# Patient Record
Sex: FEMALE | Race: WHITE | HISPANIC OR LATINO | Employment: UNEMPLOYED | URBAN - METROPOLITAN AREA
[De-identification: names, ages, dates, MRNs, and addresses within clinical notes are randomized per-mention and may not be internally consistent; named-entity substitution may affect disease eponyms.]

---

## 2017-11-27 ENCOUNTER — OFFICE VISIT (OUTPATIENT)
Dept: MEDICAL GROUP | Facility: MEDICAL CENTER | Age: 37
End: 2017-11-27
Attending: NURSE PRACTITIONER
Payer: MEDICAID

## 2017-11-27 VITALS
RESPIRATION RATE: 16 BRPM | WEIGHT: 144.6 LBS | HEART RATE: 74 BPM | TEMPERATURE: 97.3 F | SYSTOLIC BLOOD PRESSURE: 112 MMHG | OXYGEN SATURATION: 97 % | HEIGHT: 62 IN | BODY MASS INDEX: 26.61 KG/M2 | DIASTOLIC BLOOD PRESSURE: 64 MMHG

## 2017-11-27 DIAGNOSIS — Z00.00 WELL WOMAN EXAM (NO GYNECOLOGICAL EXAM): ICD-10-CM

## 2017-11-27 PROCEDURE — 99212 OFFICE O/P EST SF 10 MIN: CPT | Performed by: NURSE PRACTITIONER

## 2017-11-27 PROCEDURE — 99395 PREV VISIT EST AGE 18-39: CPT | Performed by: NURSE PRACTITIONER

## 2017-11-27 ASSESSMENT — PATIENT HEALTH QUESTIONNAIRE - PHQ9: CLINICAL INTERPRETATION OF PHQ2 SCORE: 0

## 2017-11-27 NOTE — PROGRESS NOTES
Chief Complaint: Annual Wellness Exam  HPI:  Shantel Suazo is a 37 y.o. female who presents for annual wellness exam. Generally the patient is feeling good. She has no complaints or concerns.    Regarding her health maintenance:  Last pap: 2016  Abnormal Pap hx: no  Pt postmenopausal: no  Last Mammo: 35  Abnormal mammo hx: no  Last colonoscopy: no  DEXA:no    Vaccines:  Last Tetanus booster: 2016  Influenza vaccination: 2016  Pneumococcal vaccination: n/a  Zostavax: n/a    Risk Factors:  Depression screening: using standardized screening tools: No depression  Ability to perform ADL’s: Able to perform all activities of daily living.  Hearing impairment: No hearing impairment.  Fall risks: No increased risk of falls. Uses assistive device: no  Regular exercise: no   Safety Management: Normal safety precautions including seatbelts and smoke detectors.  Cognitive function: using standardized screening tools: Normal cognitive function.  Urinary and bowel function. No incontinence, nocturia or frequency. Regular bowels. No diarrhea, constipation or rectal bleeding/melena.  Advanced directive : no information provided    Chronic Medical conditions include:  No problem-specific Assessment & Plan notes found for this encounter.      Other physicians seen:    Past medical history, family history, social history and medications reviewed and updated in the record.   Family History   Problem Relation Age of Onset   • Hypertension Mother    • Hypertension Father      Social History     Social History   • Marital status: Single     Spouse name: N/A   • Number of children: N/A   • Years of education: N/A     Occupational History   • Not on file.     Social History Main Topics   • Smoking status: Never Smoker   • Smokeless tobacco: Never Used   • Alcohol use No   • Drug use: No   • Sexual activity: Yes     Partners: Male     Birth control/ protection: Condom     Other Topics Concern   • Not on file     Social History  Narrative   • No narrative on file     Current Outpatient Prescriptions   Medication Sig Dispense Refill   • norethindrone (MICRONOR) 0.35 MG tablet Take 1 Tab by mouth every day. 28 Tab 11   • ibuprofen (MOTRIN) 800 MG Tab Take 1 Tab by mouth every 8 hours as needed (Cramping). 30 Tab 3   • docusate sodium (COLACE) 100 MG Cap Take 1 Cap by mouth 2 times a day. 60 Cap 3   • Prenatal MV-Min-Fe Fum-FA-DHA (PRENATAL 1 PO) Take  by mouth.       No current facility-administered medications for this visit.      Chronic Narcotic Medications: no    Review Of Systems  Constitutional: negative for fever, chills, weight loss or gain, fatigue, weakness  Skin: negative for pigmentation changes, rash, itching, ulcerations or nonhealing lesions  Head: negative for injury, HA  Eyes:negative for visual changes, blurred vision, double vision, eye pain, floaters. No history of cataracts or glaucoma. Corrective lenses- no, Last eye exam 2014  Ears: negative for tinnitus, hearing loss  Nose: negative for frequent URI's, sinus pain, loss of smell, chronic congestion, epistaxis  Throat: negative for persistent sore throat, dry mouth, bleeding gums, dental problems and hoarseness. Last dental exam 2015  Neck: negative for swollen glands, pain, stiffness  Respiratory: negative for cough, hemoptysis, SOB. Chronic O2 use no  Cardiovascular: negative for palpitations, chest pain, orthopnea, or peripheral edema. Negative for extremity pain at rest, claudication or color changes.  Breast: Denies breast tenderness, mass, discharge, changes in size or contour  Gastrointestinal: negative for poor appetite, dysphagia, nausea, heartburn, abdominal pain, constipation or diarrhea, changes in bowel habits, rectal bleeding  Urinary: negative for nocturia, dysuria, frequency, hematuria, hesitancy, incontinence.  Genital: negative for abnormal vaginal discharge or abnormal vaginal bleeding. Negative for hot flashes, vaginal atrophy or itching.  "   Musculoskeletal: negative for joint pain, swelling, redness, decreased range of motion. Negative for muscle pain, weakness. Negative for back pain.   Neurologic: negative for headaches, involuntary movements and tremor, weakness, numbness, dizziness, memory loss  Psychiatric: negative for sleep disturbance, anxiety, depression, excessive alcohol consumption and illegal drug usage.  Hematologic/Lymphatic/Immunologic: negative for bleeding, bruising, HIV risk factors, frequent infections  Endocrine:  Negative for heat or cold intolerance, polyuria, polydypsia    PHYSICAL EXAMINATION:  /64   Pulse 74   Temp 36.3 °C (97.3 °F)   Resp 16   Ht 1.575 m (5' 2\")   Wt 65.6 kg (144 lb 9.6 oz)   LMP 01/28/2016   SpO2 97%   BMI 26.45 kg/m²    General appearance: well developed, well nourished, well-groomed. alert and oriented X4, no distress, cooperative and pleasant with the exam.  Head: normocephalic, atraumatic  Eyes: conjunctivae and sclerae normal, PERRL  ENT: Ears: external ears normal to inspection and palpation, canals clear, TM's clear, normal light reflex, hearing normal   Nose/Sinuses: nose shows no deformity, asymmetry, or inflammation, nasal mucosa normal and pink, no lesions or sweeling, no sinus tenderness, nares patent.   Oropharynx: lips normal without lesions, buccal mucosa normal, gums healthy, teeth intact, tongue midline and normal. Tonsils normal without exudates, lesions or ulcers.  Neck: no asymmetry, masses, or scars, no adenopathy, trachea midline, thyroid normal to palpation, no jugular venous distention. Full ROM  Lungs/Chest:chest symmetric with normal A/P diameter, normal respiratory rate and rhythm, normal effort, no chest wall tenderness, lungs clear to auscultation. No wheezes, rhonchi or rales. Good air movement.   Cardiovascular: PMI normal, regular rate and rhythm, no murmurs, clicks, or gallops, no peripheral edema. Carotid arteries with 4/4 brisk upstroke and no bruits. " Pulses equal and symmetrical. No varicosities.  Abdomen: no scars or lesions, no masses palpable, no organomegaly.  Bowel sounds normal, no bruits heard, soft, non-tender, no guarding, rebound or distention.   Musculoskeletal:no evidence of joint effusion, ROM of all joints is normal, no crepitation detected, strength normal, no clubbing or cyanosis, no deformities present. No tenderness, warmth or swelling. No rigidity.  Lymphatic: None significantly enlarged or tender- cervical, supraclavicular, axillary, or inguinal.  Skin: color normal, vascularity normal, no rashes or suspicious lesions, no evidence of bleeding or bruising. Good turgor.  Neuro: speech normal, CN II-XII intact, DTR's symmetrical and normal, mental status intact, gait grossly normal, muscle tone normal, muscle strength normal, no sensory deficits. Normal Romberg.  Psychiatric: affect normal, normal memory, insight and judgement normal, concentration and focus normal. Depression screening is normal.   Depression Screen (PHQ-2/PHQ-9) 10/20/2016 10/21/2016 11/27/2017   PHQ-2 Total Score 0 0 -   PHQ-2 Total Score - - 0   PHQ-9 Total Score 0 0 -       Interpretation of PHQ-9 Total Score   Score Severity   1-4 Minimal Depression   5-9 Mild Depression   10-14 Moderate Depression   15-19 Moderately Severe Depression   20-27 Severe Depression    Cognition is tested no deficits noted. FALL RISK ASSESSMENT    Up an Go Test score:        A time of over 14 seconds identifies patient as potential fall risk.           ASSESSMENT/Plan:  Well Woman Exam         Health care Screening recommendations are reviewed with the patient today  Discussion today about general wellness and lifestyle habits to include but not limited to: Fall prevention, Engaging in regular exercise, Quitting smoking if indicated, Weight loss if indicated/ Healthy diet, Skin care  Please follow all instructions that were discussed at your visit.   Please take all medications as instructed. If  you have any adverse reactions, questions, or concerns please contact me immediately or seek emergency medical attention.  Please FU with any established specialists or with new referrals that were made today  Routine FU as scheduled

## 2018-05-16 ENCOUNTER — OFFICE VISIT (OUTPATIENT)
Dept: MEDICAL GROUP | Facility: MEDICAL CENTER | Age: 38
End: 2018-05-16
Attending: NURSE PRACTITIONER
Payer: MEDICAID

## 2018-05-16 VITALS
WEIGHT: 144 LBS | RESPIRATION RATE: 16 BRPM | TEMPERATURE: 97.2 F | HEIGHT: 64 IN | HEART RATE: 84 BPM | SYSTOLIC BLOOD PRESSURE: 114 MMHG | DIASTOLIC BLOOD PRESSURE: 64 MMHG | BODY MASS INDEX: 24.59 KG/M2

## 2018-05-16 DIAGNOSIS — Z00.00 WELL WOMAN EXAM (NO GYNECOLOGICAL EXAM): ICD-10-CM

## 2018-05-16 PROCEDURE — 99213 OFFICE O/P EST LOW 20 MIN: CPT | Performed by: NURSE PRACTITIONER

## 2018-05-16 PROCEDURE — 99396 PREV VISIT EST AGE 40-64: CPT | Performed by: NURSE PRACTITIONER

## 2018-05-16 NOTE — PROGRESS NOTES
Chief Complaint: Annual Wellness Exam  HPI:  Shantel Suazo is a 38 y.o. female who presents for annual wellness exam. Generally the patient is feeling good. She has no complaints or concerns.    Regarding her health maintenance:  Last pap: 2017  Abnormal Pap hx: no  Pt postmenopausal: 5/3/2018  Last Mammo: never. Prefers to wait until 39  Abnormal mammo hx: n/a, but no h/o breast cancer in family  Last colonoscopy: n/a  DEXA:N\A    Vaccines:  Last Tetanus booster: 2017  Influenza vaccination: 2017  Pneumococcal vaccination: n/a  Zostavax: n/a    Risk Factors:  Depression screening: using standardized screening tools: No depression  Ability to perform ADL’s: Able to perform all activities of daily living.  Hearing impairment: No hearing impairment.  Fall risks: No increased risk of falls. Uses assistive device: no  Regular exercise: yes   Safety Management: Normal safety precautions including seatbelts and smoke detectors.  Cognitive function: using standardized screening tools: Normal cognitive function.  Urinary and bowel function. No incontinence, nocturia or frequency. Regular bowels. No diarrhea, constipation or rectal bleeding/melena.  Advanced directive:  information provided    Chronic Medical conditions include:    Other physicians seen:    Past medical history, family history, social history and medications reviewed and updated in the record.   Family History   Problem Relation Age of Onset   • Hypertension Mother    • Hypertension Father      Social History     Social History   • Marital status: Single     Spouse name: N/A   • Number of children: N/A   • Years of education: N/A     Occupational History   • Not on file.     Social History Main Topics   • Smoking status: Never Smoker   • Smokeless tobacco: Never Used   • Alcohol use No   • Drug use: No   • Sexual activity: Yes     Partners: Male     Birth control/ protection: Condom     Other Topics Concern   • Not on file     Social History  Narrative   • No narrative on file     Current Outpatient Prescriptions   Medication Sig Dispense Refill   • norethindrone (MICRONOR) 0.35 MG tablet Take 1 Tab by mouth every day. 28 Tab 11   • ibuprofen (MOTRIN) 800 MG Tab Take 1 Tab by mouth every 8 hours as needed (Cramping). 30 Tab 3   • docusate sodium (COLACE) 100 MG Cap Take 1 Cap by mouth 2 times a day. 60 Cap 3   • Prenatal MV-Min-Fe Fum-FA-DHA (PRENATAL 1 PO) Take  by mouth.       No current facility-administered medications for this visit.      Chronic Narcotic Medications: no    Review Of Systems  Constitutional: negative for fever, chills, weight loss or gain, fatigue, weakness  Skin: negative for pigmentation changes, rash, itching, ulcerations or nonhealing lesions  Head: negative for injury, HA  Eyes:negative for visual changes, blurred vision, double vision, eye pain, floaters. No history of cataracts or glaucoma. Corrective lenses- no, Last eye exam 2015  Ears: negative for tinnitus, hearing loss  Nose: negative for frequent URI's, sinus pain, loss of smell, chronic congestion, epistaxis  Throat: negative for persistent sore throat, dry mouth, bleeding gums, dental problems and hoarseness. Last dental exam 2015  Neck: negative for swollen glands, pain, stiffness  Respiratory: negative for cough, hemoptysis, SOB. Chronic O2 use no  Cardiovascular: negative for palpitations, chest pain, orthopnea, or peripheral edema. Negative for extremity pain at rest, claudication or color changes.  Breast: Denies breast tenderness, mass, discharge, changes in size or contour  Gastrointestinal: negative for poor appetite, dysphagia, nausea, heartburn, abdominal pain, constipation or diarrhea, changes in bowel habits, rectal bleeding  Urinary: negative for nocturia, dysuria, frequency, hematuria, hesitancy, incontinence.  Genital: negative for abnormal vaginal discharge or abnormal vaginal bleeding. Negative for hot flashes, vaginal atrophy or itching.  "   Musculoskeletal: negative for joint pain, swelling, redness, decreased range of motion. Negative for muscle pain, weakness. Negative for back pain.   Neurologic: negative for headaches, involuntary movements and tremor, weakness, numbness, dizziness, memory loss  Psychiatric: negative for sleep disturbance, anxiety, depression, excessive alcohol consumption and illegal drug usage.  Hematologic/Lymphatic/Immunologic: negative for bleeding, bruising, HIV risk factors, frequent infections  Endocrine:  Negative for heat or cold intolerance, polyuria, polydypsia    PHYSICAL EXAMINATION:  /64   Pulse 84   Temp 36.2 °C (97.2 °F)   Resp 16   Ht 1.626 m (5' 4\")   Wt 65.3 kg (144 lb)   LMP 01/28/2016   BMI 24.72 kg/m²    General appearance: well developed, well nourished, well-groomed. alert and oriented X4, no distress, cooperative and pleasant with the exam.  Head: normocephalic, atraumatic  Eyes: conjunctivae and sclerae normal, PERRL  ENT: Ears: external ears normal to inspection and palpation, canals clear, TM's clear, normal light reflex, hearing normal   Nose/Sinuses: nose shows no deformity, asymmetry, or inflammation, nasal mucosa normal and pink, no lesions or sweeling, no sinus tenderness, nares patent.   Oropharynx: lips normal without lesions, buccal mucosa normal, gums healthy, teeth intact, tongue midline and normal. Tonsils normal without exudates, lesions or ulcers.  Neck: no asymmetry, masses, or scars, no adenopathy, trachea midline, thyroid normal to palpation, no jugular venous distention. Full ROM  Lungs/Chest:chest symmetric with normal A/P diameter, normal respiratory rate and rhythm, normal effort, no chest wall tenderness, lungs clear to auscultation. No wheezes, rhonchi or rales. Good air movement.   Cardiovascular: PMI normal, regular rate and rhythm, no murmurs, clicks, or gallops, no peripheral edema. Carotid arteries with 4/4 brisk upstroke and no bruits. Pulses equal and " symmetrical. No varicosities.  Abdomen: no scars or lesions, no masses palpable, no organomegaly.  Bowel sounds normal, no bruits heard, soft, non-tender, no guarding, rebound or distention.   Musculoskeletal:no evidence of joint effusion, ROM of all joints is normal, no crepitation detected, strength normal, no clubbing or cyanosis, no deformities present. No tenderness, warmth or swelling. No rigidity.  Lymphatic: None significantly enlarged or tender- cervical, supraclavicular, axillary, or inguinal.  Skin: color normal, vascularity normal, no rashes or suspicious lesions, no evidence of bleeding or bruising. Good turgor.  Neuro: speech normal, CN II-XII intact, DTR's symmetrical and normal, mental status intact, gait grossly normal, muscle tone normal, muscle strength normal, no sensory deficits. Normal Romberg.  Psychiatric: affect normal, normal memory, insight and judgement normal, concentration and focus normal. Depression screening is normal.   Depression Screen (PHQ-2/PHQ-9) 10/20/2016 10/21/2016 11/27/2017   PHQ-2 Total Score 0 0 -   PHQ-2 Total Score - - 0   PHQ-9 Total Score 0 0 -       Interpretation of PHQ-9 Total Score   Score Severity   1-4 Minimal Depression   5-9 Mild Depression   10-14 Moderate Depression   15-19 Moderately Severe Depression   20-27 Severe Depression    Cognition is tested no deficits noted. FALL RISK ASSESSMENT    Up an Go Test score:        A time of over 14 seconds identifies patient as potential fall risk.           ASSESSMENT/Plan:  1. Well woman exam (no gynecological exam)    - LIPID PANEL  - HEMOGLOBIN A1C; Future  - COMP METABOLIC PANEL; Future  - TSH WITH REFLEX TO FT4; Future  - HIV ANTIBODIES; Future  - HEP C VIRUS ANTIBODY; Future  - CHLAMYDIA/GC PCR URINE OR SWAB; Future  - RPR       Health care Screening recommendations are reviewed with the patient today  Discussion today about general wellness and lifestyle habits to include but not limited to: Fall prevention,  Engaging in regular exercise, Quitting smoking if indicated, Weight loss if indicated/ Healthy diet, Skin care  Please follow all instructions that were discussed at your visit.   Please take all medications as instructed. If you have any adverse reactions, questions, or concerns please contact me immediately or seek emergency medical attention.  Please FU with any established specialists or with new referrals that were made today  Routine FU as scheduled

## 2018-05-17 ENCOUNTER — HOSPITAL ENCOUNTER (OUTPATIENT)
Dept: LAB | Facility: MEDICAL CENTER | Age: 38
End: 2018-05-17
Attending: NURSE PRACTITIONER
Payer: MEDICAID

## 2018-05-17 DIAGNOSIS — Z00.00 WELL WOMAN EXAM (NO GYNECOLOGICAL EXAM): ICD-10-CM

## 2018-05-17 LAB
ALBUMIN SERPL BCP-MCNC: 4.3 G/DL (ref 3.2–4.9)
ALBUMIN/GLOB SERPL: 1.3 G/DL
ALP SERPL-CCNC: 54 U/L (ref 30–99)
ALT SERPL-CCNC: 16 U/L (ref 2–50)
ANION GAP SERPL CALC-SCNC: 5 MMOL/L (ref 0–11.9)
AST SERPL-CCNC: 15 U/L (ref 12–45)
BILIRUB SERPL-MCNC: 0.5 MG/DL (ref 0.1–1.5)
BUN SERPL-MCNC: 8 MG/DL (ref 8–22)
C TRACH DNA SPEC QL NAA+PROBE: NEGATIVE
CALCIUM SERPL-MCNC: 9.3 MG/DL (ref 8.5–10.5)
CHLORIDE SERPL-SCNC: 106 MMOL/L (ref 96–112)
CHOLEST SERPL-MCNC: 198 MG/DL (ref 100–199)
CO2 SERPL-SCNC: 27 MMOL/L (ref 20–33)
CREAT SERPL-MCNC: 0.53 MG/DL (ref 0.5–1.4)
EST. AVERAGE GLUCOSE BLD GHB EST-MCNC: 117 MG/DL
GLOBULIN SER CALC-MCNC: 3.3 G/DL (ref 1.9–3.5)
GLUCOSE SERPL-MCNC: 93 MG/DL (ref 65–99)
HBA1C MFR BLD: 5.7 % (ref 0–5.6)
HCV AB SER QL: NEGATIVE
HDLC SERPL-MCNC: 64 MG/DL
HIV 1+2 AB+HIV1 P24 AG SERPL QL IA: NON REACTIVE
LDLC SERPL CALC-MCNC: 118 MG/DL
N GONORRHOEA DNA SPEC QL NAA+PROBE: NEGATIVE
POTASSIUM SERPL-SCNC: 4 MMOL/L (ref 3.6–5.5)
PROT SERPL-MCNC: 7.6 G/DL (ref 6–8.2)
SODIUM SERPL-SCNC: 138 MMOL/L (ref 135–145)
SPECIMEN SOURCE: NORMAL
TREPONEMA PALLIDUM IGG+IGM AB [PRESENCE] IN SERUM OR PLASMA BY IMMUNOASSAY: NON REACTIVE
TRIGL SERPL-MCNC: 82 MG/DL (ref 0–149)
TSH SERPL DL<=0.005 MIU/L-ACNC: 1.01 UIU/ML (ref 0.38–5.33)

## 2018-05-17 PROCEDURE — 80061 LIPID PANEL: CPT

## 2018-05-17 PROCEDURE — 36415 COLL VENOUS BLD VENIPUNCTURE: CPT

## 2018-05-17 PROCEDURE — 87389 HIV-1 AG W/HIV-1&-2 AB AG IA: CPT

## 2018-05-17 PROCEDURE — 87491 CHLMYD TRACH DNA AMP PROBE: CPT

## 2018-05-17 PROCEDURE — 87591 N.GONORRHOEAE DNA AMP PROB: CPT

## 2018-05-17 PROCEDURE — 86803 HEPATITIS C AB TEST: CPT

## 2018-05-17 PROCEDURE — 80053 COMPREHEN METABOLIC PANEL: CPT

## 2018-05-17 PROCEDURE — 86780 TREPONEMA PALLIDUM: CPT

## 2018-05-17 PROCEDURE — 84443 ASSAY THYROID STIM HORMONE: CPT

## 2018-05-17 PROCEDURE — 83036 HEMOGLOBIN GLYCOSYLATED A1C: CPT
